# Patient Record
Sex: MALE | Race: WHITE | NOT HISPANIC OR LATINO | Employment: FULL TIME | URBAN - METROPOLITAN AREA
[De-identification: names, ages, dates, MRNs, and addresses within clinical notes are randomized per-mention and may not be internally consistent; named-entity substitution may affect disease eponyms.]

---

## 2024-05-28 ENCOUNTER — HOSPITAL ENCOUNTER (EMERGENCY)
Facility: HOSPITAL | Age: 59
Discharge: HOME | End: 2024-05-28
Payer: COMMERCIAL

## 2024-05-28 ENCOUNTER — APPOINTMENT (OUTPATIENT)
Dept: RADIOLOGY | Facility: HOSPITAL | Age: 59
End: 2024-05-28
Payer: COMMERCIAL

## 2024-05-28 VITALS
TEMPERATURE: 98.2 F | SYSTOLIC BLOOD PRESSURE: 147 MMHG | WEIGHT: 216 LBS | HEART RATE: 61 BPM | OXYGEN SATURATION: 100 % | DIASTOLIC BLOOD PRESSURE: 92 MMHG | RESPIRATION RATE: 18 BRPM | HEIGHT: 70 IN | BODY MASS INDEX: 30.92 KG/M2

## 2024-05-28 DIAGNOSIS — S46.911A SHOULDER STRAIN, RIGHT, INITIAL ENCOUNTER: Primary | ICD-10-CM

## 2024-05-28 DIAGNOSIS — S46.211A STRAIN OF RIGHT BICEPS MUSCLE, INITIAL ENCOUNTER: ICD-10-CM

## 2024-05-28 PROCEDURE — 73060 X-RAY EXAM OF HUMERUS: CPT | Mod: RT

## 2024-05-28 PROCEDURE — 99284 EMERGENCY DEPT VISIT MOD MDM: CPT | Mod: 25

## 2024-05-28 PROCEDURE — 73030 X-RAY EXAM OF SHOULDER: CPT | Mod: RIGHT SIDE | Performed by: RADIOLOGY

## 2024-05-28 PROCEDURE — 73030 X-RAY EXAM OF SHOULDER: CPT | Mod: RT

## 2024-05-28 PROCEDURE — 73060 X-RAY EXAM OF HUMERUS: CPT | Mod: RIGHT SIDE | Performed by: RADIOLOGY

## 2024-05-28 RX ORDER — HYDROCODONE BITARTRATE AND ACETAMINOPHEN 5; 325 MG/1; MG/1
1 TABLET ORAL EVERY 6 HOURS PRN
Qty: 12 TABLET | Refills: 0 | Status: SHIPPED | OUTPATIENT
Start: 2024-05-28 | End: 2024-05-31

## 2024-05-28 ASSESSMENT — COLUMBIA-SUICIDE SEVERITY RATING SCALE - C-SSRS
6. HAVE YOU EVER DONE ANYTHING, STARTED TO DO ANYTHING, OR PREPARED TO DO ANYTHING TO END YOUR LIFE?: NO
1. IN THE PAST MONTH, HAVE YOU WISHED YOU WERE DEAD OR WISHED YOU COULD GO TO SLEEP AND NOT WAKE UP?: NO
2. HAVE YOU ACTUALLY HAD ANY THOUGHTS OF KILLING YOURSELF?: NO

## 2024-05-28 NOTE — DISCHARGE INSTRUCTIONS
Use sling for comfort and to reduce movement to aid in healing.  May continue to use ibuprofen for pain.  May use stronger pain medicine at night for sleep.  May rest arm on pillow for more comfort.

## 2024-05-28 NOTE — ED PROVIDER NOTES
HPI   Chief Complaint   Patient presents with    Shoulder Pain       HPI This is a 58-year-old male who presents with right shoulder pain after injury at work on Thursday.  He told his boss was good rest on Friday.  Presents here now with Workmen's Comp. Form.  He has had impingement before he felt the muscle pop he heard a noise.  He still can move his arm.  Denying any black and blueness.  He is right-handed.  He states that he lifted was a bit too heavy for him.                    [place] Coma Scale Score: 15                     Patient History   No past medical history on file.  No past surgical history on file.  No family history on file.  Social History     Tobacco Use    Smoking status: Not on file    Smokeless tobacco: Not on file   Substance Use Topics    Alcohol use: Not on file    Drug use: Not on file       Physical Exam   ED Triage Vitals   Temp Pulse Resp BP   -- -- -- --      SpO2 Temp src Heart Rate Source Patient Position   -- -- -- --      BP Location FiO2 (%)     -- --       Physical Exam    Reviewed family history social history and allergies and were noncontributory to current problem.    Review of systems as noted in history of present illness  otherwise negative. All other systems were reviewed and negative.     PMHX: Chronic conditions: reviewd in EMR, relevant history noted in HPI                Surgeries, hospitalizations: reviewed in EMR , relevant history noted in HPI                Medications: reviewed in EMR, relevant history noted in HPI                Allergies: reviewed in EMR, relevant history noted in HPI      PHYSICAL EXAM:    GENERAL/ CONSTITUTIONAL: Vitals noted, no distress. Alert and oriented  x 3. Non-toxic.  No Drooling or stridor .    HEAD: Normocephalic Atraumatic    EENT:  Posterior oropharynx unremarkable. No meningismus. No LAD.  No exudate present.      EYES: PERRLA EOMI     NECK: Supple. Nontender. No midline tenderness.  Full range of motion    CARDIAC: Regular,  rate, rhythm. No murmurs rubs or gallops. No JVD    PULMONARY: Lungs clear bilaterally with good aeration. No wheezes rales or rhonchi. No respiratory distress.     GI: Soft, . Nontender. No peritoneal signs. Normoactive bowel sounds. No pulsatile masses.  No guarding or rebound    EXTREMITIES/MUSCULOSKELTAL: No peripheral edema.  NVIT, pulses +2 /4 equal. FROM in all extremities and equal.  Right shoulder is tender anterior aspect.  There is some tenderness noted proximal biceps extending downwards.  There is no bruising noted on biceps.  Patient flex his arm there is some tenderness noted anteriorly.  Patient is able to AB duct 90 degrees with some tenderness as well anteriorly and also on the right medial proximal shoulder biceps area.  Good strength is noted.  There is tenderness with movements as noted above.  Especially to the flexion of bicep.  Compartments are soft.  No redness noted no discoloration noted no obvious deformity noted    SKIN: No rash. Intact.     NEURO: No focal neurologic deficits,     PSYCH: appropriate mood and affect    MEDICAL DECISION MAKING:  ED Course & MDM   ED Course as of 05/28/24 0902   e May 28, 2024   0856 Two views right shoulder, two views right humerus:  There is no fracture or dislocation.  There is degenerative change of the right acromioclavicular joint  with mild spurring superiorly and moderate spurring inferiorly There  is degenerative change with spurring of the greater and lesser  tuberosities.      IMPRESSION:  No acute bony abnormality right shoulder and right humerus   [CV]      ED Course User Index  [CV] Nicolle Torres PA-C         Diagnoses as of 05/28/24 0902   Shoulder strain, right, initial encounter   Strain of right biceps muscle, initial encounter     X-ray of shoulder and humerus unremarkable for any acute bony injury.  Placed in sling follow-up with orthopedics.  Some degenerative changes of the AC joint and lesser and greater tuberosities  Medical  Decision Making    HomeGoing follow-up with Workmen's Comp./ ortho  Procedure  Procedures     Nicolle Torres PA-C  05/28/24 0902

## 2024-05-28 NOTE — Clinical Note
Cuco Lorena was seen and treated in our emergency department on 5/28/2024.  He may return to work on 05/30/2024.       If you have any questions or concerns, please don't hesitate to call.      Nicolle Torres PA-C